# Patient Record
Sex: FEMALE | Race: WHITE
[De-identification: names, ages, dates, MRNs, and addresses within clinical notes are randomized per-mention and may not be internally consistent; named-entity substitution may affect disease eponyms.]

---

## 2020-06-20 ENCOUNTER — HOSPITAL ENCOUNTER (EMERGENCY)
Dept: HOSPITAL 26 - MED | Age: 30
Discharge: HOME | End: 2020-06-20
Payer: SELF-PAY

## 2020-06-20 VITALS — DIASTOLIC BLOOD PRESSURE: 84 MMHG | SYSTOLIC BLOOD PRESSURE: 141 MMHG

## 2020-06-20 VITALS — HEIGHT: 63 IN | WEIGHT: 150 LBS | BODY MASS INDEX: 26.58 KG/M2

## 2020-06-20 VITALS — DIASTOLIC BLOOD PRESSURE: 80 MMHG | SYSTOLIC BLOOD PRESSURE: 136 MMHG
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
## 2020-06-20 DIAGNOSIS — S46.911A: Primary | ICD-10-CM
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
## 2020-06-20 PROCEDURE — 99283 EMERGENCY DEPT VISIT LOW MDM: CPT

## 2020-06-20 PROCEDURE — 96372 THER/PROPH/DIAG INJ SC/IM: CPT

## 2020-06-20 PROCEDURE — 29105 APPLICATION LONG ARM SPLINT: CPT

## 2020-06-20 PROCEDURE — 73030 X-RAY EXAM OF SHOULDER: CPT

## 2020-06-20 NOTE — NUR
28 Y/O F C/C RIGHT SHOULDER PAIN X TODAY. PER PT PLAYING WITH KIDS AND FELL ON 
HER RIGHT SHOULDER. PT PRESENTS WITH LIMITED ROM. PAIN 8/10. NOT TAKEN OTC RX. 
PT NKA. NO HX. NO RX. NO NVD. SIDE RAIL X1.

## 2020-06-20 NOTE — NUR
-------------------------------------------------------------------------------

            *** Note brien in EDM - 06/20/20 at 1629 by LANDON ***            

-------------------------------------------------------------------------------

30 Y/O F C/C RIGHT SHOULDER PAIN X TODAY. PER PT PLAYING WITH KIDS AND FELL ON 
HER RIGHT SHOULDER. PT PRESENTS WITH LIMITED ROM. PAIN 8/10. NOT TAKEN OTC RX. 
PT NKA. NO HX. NO RX. NO NVD. SIDE RAIL X1.